# Patient Record
Sex: FEMALE | Race: WHITE | NOT HISPANIC OR LATINO
[De-identification: names, ages, dates, MRNs, and addresses within clinical notes are randomized per-mention and may not be internally consistent; named-entity substitution may affect disease eponyms.]

---

## 2017-03-21 ENCOUNTER — APPOINTMENT (OUTPATIENT)
Dept: GYNECOLOGIC ONCOLOGY | Facility: CLINIC | Age: 47
End: 2017-03-21

## 2017-03-21 VITALS
WEIGHT: 293 LBS | DIASTOLIC BLOOD PRESSURE: 60 MMHG | SYSTOLIC BLOOD PRESSURE: 110 MMHG | HEIGHT: 70 IN | BODY MASS INDEX: 41.95 KG/M2

## 2017-09-26 ENCOUNTER — APPOINTMENT (OUTPATIENT)
Dept: GYNECOLOGIC ONCOLOGY | Facility: CLINIC | Age: 47
End: 2017-09-26

## 2017-11-06 ENCOUNTER — APPOINTMENT (OUTPATIENT)
Dept: GYNECOLOGIC ONCOLOGY | Facility: CLINIC | Age: 47
End: 2017-11-06
Payer: COMMERCIAL

## 2017-11-06 VITALS
BODY MASS INDEX: 41.95 KG/M2 | SYSTOLIC BLOOD PRESSURE: 125 MMHG | HEIGHT: 70 IN | DIASTOLIC BLOOD PRESSURE: 72 MMHG | WEIGHT: 293 LBS

## 2017-11-06 DIAGNOSIS — R19.4 CHANGE IN BOWEL HABIT: ICD-10-CM

## 2017-11-06 DIAGNOSIS — R19.7 DIARRHEA, UNSPECIFIED: ICD-10-CM

## 2017-11-06 PROCEDURE — 99214 OFFICE O/P EST MOD 30 MIN: CPT

## 2017-11-13 LAB — CYTOLOGY CVX/VAG DOC THIN PREP: NORMAL

## 2017-11-18 RX ORDER — SPIRONOLACTONE 25 MG/1
25 TABLET ORAL DAILY
Refills: 0 | Status: ACTIVE | COMMUNITY

## 2017-11-18 RX ORDER — FUROSEMIDE 40 MG/1
40 TABLET ORAL DAILY
Refills: 0 | Status: ACTIVE | COMMUNITY

## 2018-04-23 ENCOUNTER — APPOINTMENT (OUTPATIENT)
Dept: GYNECOLOGIC ONCOLOGY | Facility: CLINIC | Age: 48
End: 2018-04-23
Payer: COMMERCIAL

## 2018-04-23 VITALS
HEIGHT: 70 IN | BODY MASS INDEX: 41.95 KG/M2 | DIASTOLIC BLOOD PRESSURE: 80 MMHG | WEIGHT: 293 LBS | SYSTOLIC BLOOD PRESSURE: 125 MMHG

## 2018-04-23 DIAGNOSIS — R23.2 FLUSHING: ICD-10-CM

## 2018-04-23 DIAGNOSIS — N63.20 UNSPECIFIED LUMP IN THE LEFT BREAST, UNSPECIFIED QUADRANT: ICD-10-CM

## 2018-04-23 PROCEDURE — 99214 OFFICE O/P EST MOD 30 MIN: CPT

## 2018-04-23 RX ORDER — RIVAROXABAN 2.5 MG/1
TABLET, FILM COATED ORAL
Refills: 0 | Status: ACTIVE | COMMUNITY

## 2018-04-27 LAB — CYTOLOGY CVX/VAG DOC THIN PREP: NORMAL

## 2018-10-24 ENCOUNTER — APPOINTMENT (OUTPATIENT)
Dept: GYNECOLOGIC ONCOLOGY | Facility: CLINIC | Age: 48
End: 2018-10-24
Payer: COMMERCIAL

## 2018-10-24 VITALS
HEIGHT: 70 IN | DIASTOLIC BLOOD PRESSURE: 75 MMHG | SYSTOLIC BLOOD PRESSURE: 138 MMHG | WEIGHT: 293 LBS | BODY MASS INDEX: 41.95 KG/M2

## 2018-10-24 PROCEDURE — 99214 OFFICE O/P EST MOD 30 MIN: CPT

## 2019-05-01 ENCOUNTER — APPOINTMENT (OUTPATIENT)
Dept: GYNECOLOGIC ONCOLOGY | Facility: CLINIC | Age: 49
End: 2019-05-01
Payer: COMMERCIAL

## 2019-05-01 VITALS
DIASTOLIC BLOOD PRESSURE: 75 MMHG | HEIGHT: 70 IN | WEIGHT: 293 LBS | BODY MASS INDEX: 41.95 KG/M2 | SYSTOLIC BLOOD PRESSURE: 131 MMHG

## 2019-05-01 PROCEDURE — 99213 OFFICE O/P EST LOW 20 MIN: CPT

## 2019-05-01 RX ORDER — SPIRONOLACTONE 25 MG/1
25 TABLET ORAL DAILY
Refills: 0 | Status: DISCONTINUED | COMMUNITY
End: 2019-05-01

## 2019-05-01 RX ORDER — FUROSEMIDE 40 MG/1
40 TABLET ORAL DAILY
Refills: 0 | Status: DISCONTINUED | COMMUNITY
End: 2019-05-01

## 2019-05-07 LAB
CYTOLOGY CVX/VAG DOC THIN PREP: NORMAL
HPV HIGH+LOW RISK DNA PNL CVX: DETECTED

## 2019-12-02 ENCOUNTER — APPOINTMENT (OUTPATIENT)
Dept: GYNECOLOGIC ONCOLOGY | Facility: CLINIC | Age: 49
End: 2019-12-02

## 2020-02-24 ENCOUNTER — APPOINTMENT (OUTPATIENT)
Dept: GYNECOLOGIC ONCOLOGY | Facility: CLINIC | Age: 50
End: 2020-02-24
Payer: COMMERCIAL

## 2020-02-24 VITALS
SYSTOLIC BLOOD PRESSURE: 104 MMHG | HEART RATE: 78 BPM | WEIGHT: 293 LBS | DIASTOLIC BLOOD PRESSURE: 67 MMHG | HEIGHT: 70 IN | BODY MASS INDEX: 41.95 KG/M2

## 2020-02-24 DIAGNOSIS — R94.8 ABNORMAL RESULTS OF FUNCTION STUDIES OF OTHER ORGANS AND SYSTEMS: ICD-10-CM

## 2020-02-24 PROCEDURE — 99214 OFFICE O/P EST MOD 30 MIN: CPT

## 2020-02-26 LAB
CYTOLOGY CVX/VAG DOC THIN PREP: NORMAL
HPV HIGH+LOW RISK DNA PNL CVX: NOT DETECTED

## 2020-03-01 PROBLEM — R94.8 ABNORMAL POSITRON EMISSION TOMOGRAPHY (PET) SCAN: Status: ACTIVE | Noted: 2020-03-01

## 2020-06-01 ENCOUNTER — APPOINTMENT (OUTPATIENT)
Dept: GYNECOLOGIC ONCOLOGY | Facility: CLINIC | Age: 50
End: 2020-06-01
Payer: COMMERCIAL

## 2020-06-01 DIAGNOSIS — N84.1 POLYP OF CERVIX UTERI: ICD-10-CM

## 2020-06-01 PROCEDURE — 99211 OFF/OP EST MAY X REQ PHY/QHP: CPT | Mod: GT

## 2020-06-01 NOTE — HISTORY OF PRESENT ILLNESS
[FreeTextEntry1] : Referred by: Dr. Norris\par PCP: Dr. BASILIA Solitario\par Cardiologist: Dr. Nichole\par \par Ms. 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 Pertinent history is as follow.\par \par She presented to the ER at [facility] with abdominal pain. Pelvic ultrasound demonstrated a prominent thick walled cystic lesion with debris associated with the right adnexa. On 02/06/15, she underwent exploratory laparotomy, RIGO, RSO, left salpingectomy and partial resection of the left ovary with JARON. Unfortunately pathology demonstrated clear cell carcinoma of the ovary. During recuperation, she suffered several setbacks most importantly an MI requiring cardiac stent and IABP x 2 days in March, an post-operative abdominal abscess requiring reopening of the wound with VAC therapy, and an episode of sepsis requiring a two day stay in ICU.\par \par Since her surgery, she has undergone chemotherapy with Carbol/Taxol x 6 which was completed on 08/27/15.\par \par 06/19/15: CT A/P is significant for interval increase in abdominal  and pelvic lymphadenopathy concerning for interval progression of metastatic disease. \par \par 07/27/15: PET/CT scan demonstrates extensive avid lymphadenopathy in the neck, mediastinum, right hilum, retroperitoneum, bilateral pelvic sidewalls, bilateral inguinal regions and bilateral axillary regions. Focal uptake in the region of valentino hepatis, likely corresponding to a cluster of small nodes is seen, but the exact anatomic correlation is limited. \par \par 08/18/15: FNA of left inguinal lymph nodes was negative for malignant cells. \par \par Of note, additional history includes hypercoagulable disorder (lupus anticoagulant), DVT and PE (2004). She takes arixtra on a daily basis. She is also on antiplatelet therapy with plavix and low dose ASA for the CAD. She will require mitral valve replacement in the future as it was damaged during her MI in March. \par \par Since chemotherapy, she is feeling well except for fatigue which is starting to improve. \par \par Int Hx: (11/6/17) - She RTO feeling well, noting no VB or VD. No pain is reported. She is on Gamma Globulin for persistent Tcp. (4/23/18): Off Gamma Globulin. Tried Retuzin, not continued. No VB or VD. No Pain. No GI or  concerns. Decreased Xarelto dose to 10 from 20. Started pomegranate juice. She notes 1 month ago a ?vein on the left medial aspect of the breast. (prior to the PET she first noted it. No pain there. \par \par Int Hx: (10/24/18) - no VB or VD. No GI or  concerns. [facility] discussed - see below; await reports. She said she was told by PCP she did not need to see a Breast Specialist and she didn't; I reiterated the reason for my advise. She plans ongoing care and CBE by Dr BELEN KELLY. Disc her CT, Pap, and  reports. \par \par Int Hx: (5/1/19) - no VB or VD. No GI or  concerns. [facility] discussed - see below. She confirms ongoing care and CBE by Dr JENNINGS. No LEANNEA report. Disc her PET (4/19) and  (1/19) reports. \par \par Int Hx: (2/24/20) - no VB or VD. No GI or  concerns. [facility]: directed by Dr BELEN PERDOMOA report.  (1/19) =12. US Feb 2020 - ?EC polyp, ? Rov mass v LN. PET Jan 2020 - 2 areas of avidity in pelvic, ? etiology. \par \par Int Hx: (6/1/20)- no VB or VD. No GI or  concerns.  [facility]: directed by Dr. GLOVER  No DEXA report.\par - she reports a prior value of 10 by Dr JENNINGS. She saw Dr JENNINGS in May 2020,  is apparently pending. \par Sono: Feb 2020 - <1cm polyp; ?right and vs LN. PET/CT- 4/22/20- MOHAMUD. \par \par : postop, [id number]=125 (pp), Jul 2015=5, Feb 2016=5.1, 9.9, Sept 2016=4.6, Dec 2016=11, Oct 2017=20, Feb 2018=11, Aug 2018=13, Jan 2019=12, Feb 2020=10 (pp)\par \par HM:\par BHM: Directed by Dr Norris. CBE confirmed by the pt, and she reports being sent for a Mgm. BSEqm disc. \par DEXA: No report. \par Pap: Feb 2020 - Pap neg, HPV Neg\par Colon cancer screening: never

## 2020-06-01 NOTE — DISCUSSION/SUMMARY
[Reviewed Clinical Lab Test(s)] : Results of clinical tests were reviewed. [Reviewed Radiology Report(s)] : Radiology reports were reviewed. [FreeTextEntry1] : I met with the pt for a TH lasting ~7min. \par -We disc her surveillance and asked for the [id number] to be forwarded. I also asked that she inquire about the PET report from Jason Ruiz (complete report) for my review. \par -We discussed her prior sono as well re the ?polyp, and the Pap/HPV testing. We will further eval at next clinical exam. \par -She is due for a DEXA,a nd she said she wishes to defer at this time but will consider when "things settle down."  \par -We discussed her BHM and BSE qm. She says Dr JENNINGS performs the CBE and is sending her for a Mgm. \par -Her instructions were reviewed.\par -All Q/A.\par -She'll RTO for an exam in Aug 2020.

## 2020-06-01 NOTE — REASON FOR VISIT
[FreeTextEntry1] : For oncologic surveillance of ovarian cancer\par \par Pt was id'd by name and birthdate after giving verbal consent for a TH visit.  [Home] : at home, [unfilled] , at the time of the visit. [Medical Office: ([facility])___] : at the medical office located in  [Verbal consent obtained from patient] : the patient, [unfilled]

## 2020-09-21 ENCOUNTER — APPOINTMENT (OUTPATIENT)
Dept: GYNECOLOGIC ONCOLOGY | Facility: CLINIC | Age: 50
End: 2020-09-21
Payer: COMMERCIAL

## 2020-09-21 VITALS
BODY MASS INDEX: 41.95 KG/M2 | WEIGHT: 293 LBS | HEIGHT: 70 IN | SYSTOLIC BLOOD PRESSURE: 123 MMHG | HEART RATE: 68 BPM | DIASTOLIC BLOOD PRESSURE: 86 MMHG

## 2020-09-21 PROCEDURE — 99214 OFFICE O/P EST MOD 30 MIN: CPT

## 2020-09-21 NOTE — REASON FOR VISIT
[Home] : at home, [unfilled] , at the time of the visit. [Medical Office: ([facility])___] : at the medical office located in  [Verbal consent obtained from patient] : the patient, [unfilled] [FreeTextEntry1] : For oncologic surveillance of ovarian cancer\par \par

## 2020-09-21 NOTE — DISCUSSION/SUMMARY
[Reviewed Clinical Lab Test(s)] : Results of clinical tests were reviewed. [Reviewed Radiology Report(s)] : Radiology reports were reviewed. [FreeTextEntry1] : I met with the pt. \par -We disc her surveillance and the prior imaging. I have asked for the [id number] to be forwarded and my card provided with new contact info. Disc imaging schedule thus far, will defer to Dr JENNINGS, unless  or sx warrant. \par -Disc her prior vag sx and current eval; disc role of plain douching. \par -We discussed her Pap and HPV testing.  \par -We still await her DEXA.   \par -We have discussed her BHM and BSE qm. She says \par -Her instructions were reviewed.\par -All Q/A.\par -She'll RTO in 6m.

## 2020-09-21 NOTE — PHYSICAL EXAM
[Abnormal] : Examination of extremities for edema and/or varicosities: Abnormal [Absent] : Uterus: Absent [Normal] : Recto-Vaginal Exam: Normal [de-identified] : 1+ edema [de-identified] : atrophy [de-identified] : T [de-identified] : The remaining adnexa was nonpalpable

## 2020-09-21 NOTE — HISTORY OF PRESENT ILLNESS
[FreeTextEntry1] : Referred by: Dr. Norris\par PCP: Dr. BASILIA Solitario\par Cardiologist: Dr. Nichole\par \par Ms. 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 Pertinent history is as follow.\par \par She presented to the ER at [facility] with abdominal pain. Pelvic ultrasound demonstrated a prominent thick walled cystic lesion with debris associated with the right adnexa. On 02/06/15, she underwent exploratory laparotomy, RIGO, RSO, left salpingectomy and partial resection of the left ovary with JARON. Unfortunately pathology demonstrated clear cell carcinoma of the ovary. During recuperation, she suffered several setbacks most importantly an MI requiring cardiac stent and IABP x 2 days in March, an post-operative abdominal abscess requiring reopening of the wound with VAC therapy, and an episode of sepsis requiring a two day stay in ICU.\par \par Since her surgery, she has undergone chemotherapy with Carbol/Taxol x 6 which was completed on 08/27/15.\par \par 06/19/15: CT A/P is significant for interval increase in abdominal  and pelvic lymphadenopathy concerning for interval progression of metastatic disease. \par \par 07/27/15: PET/CT scan demonstrates extensive avid lymphadenopathy in the neck, mediastinum, right hilum, retroperitoneum, bilateral pelvic sidewalls, bilateral inguinal regions and bilateral axillary regions. Focal uptake in the region of valentino hepatis, likely corresponding to a cluster of small nodes is seen, but the exact anatomic correlation is limited. \par \par 08/18/15: FNA of left inguinal lymph nodes was negative for malignant cells. \par \par Of note, additional history includes hypercoagulable disorder (lupus anticoagulant), DVT and PE (2004). She takes arixtra on a daily basis. She is also on antiplatelet therapy with plavix and low dose ASA for the CAD. She will require mitral valve replacement in the future as it was damaged during her MI in March. \par \par Since chemotherapy, she is feeling well except for fatigue which is starting to improve. \par \par Int Hx: (11/6/17) - She RTO feeling well, noting no VB or VD. No pain is reported. She is on Gamma Globulin for persistent Tcp. (4/23/18): Off Gamma Globulin. Tried Retuzin, not continued. No VB or VD. No Pain. No GI or  concerns. Decreased Xarelto dose to 10 from 20. Started pomegranate juice. She notes 1 month ago a ?vein on the left medial aspect of the breast. (prior to the PET she first noted it. No pain there. \par \par Int Hx: (10/24/18) - no VB or VD. No GI or  concerns. [facility] discussed - see below; await reports. She said she was told by PCP she did not need to see a Breast Specialist and she didn't; I reiterated the reason for my advise. She plans ongoing care and CBE by Dr BELEN KELLY. Disc her CT, Pap, and  reports. \par \par Int Hx: (5/1/19) - no VB or VD. No GI or  concerns. [facility] discussed - see below. She confirms ongoing care and CBE by Dr JENNINGS. No DEXA report. Disc her PET (4/19) and  (1/19) reports. \par \par Int Hx: (2/24/20) - no VB or VD. No GI or  concerns. [facility]: directed by Dr BELEN PERDOMOA report.  (1/19) =12. US Feb 2020 - ?EC polyp, ? Rov mass v LN. PET Jan 2020 - 2 areas of avidity in pelvic, ? etiology. \par \par Int Hx: (6/1/20)- no VB or VD. No GI or  concerns.  [facility]: directed by Dr. GLOVER  No DEXA report.\par - she reports a prior value of 10 by Dr JENNINGS. She saw Dr JENNINGS in May 2020,  is apparently pending. \par Sono: Feb 2020 - <1cm polyp; ?right and vs LN. PET/CT- 4/22/20- MOHAMUD. \par \par \par : postop, [id number]=207 (pp), Jul 2015=5, Feb 2016=5.1, 9.9, Sept 2016=4.6, Dec 2016=11, Oct 2017=20, Feb 2018=11, Aug 2018=13, Jan 2019=12, Feb 2020=10 (pp)\par \par Int Hx: (9/21/20)- no VB or VD. ROS: No GI or  concerns. She reports since last visit she had vaginal itching and used first Vagisil with burning and then Monostat with relief. To see Dr JENNINGS end of this month for eval and . \par PET/CT- 4/22/20- MOHAMUD. \par - she reports a prior value of 10 by Dr JENNINGS. She saw Dr JENNINGS in May 2020. \par Pap/HPV: Feb 2020 - neg\par BHM: directed by Dr. JENNINGS.  \par DEXA: no Report. \par CCS: none rec'd.

## 2020-09-21 NOTE — REVIEW OF SYSTEMS
[Negative] : Musculoskeletal [Abn Vag Bleeding] : no abnormal vaginal bleeding [FreeTextEntry4] : See HPI

## 2021-03-22 ENCOUNTER — APPOINTMENT (OUTPATIENT)
Dept: GYNECOLOGIC ONCOLOGY | Facility: CLINIC | Age: 51
End: 2021-03-22
Payer: COMMERCIAL

## 2021-03-22 VITALS
DIASTOLIC BLOOD PRESSURE: 81 MMHG | HEIGHT: 70 IN | BODY MASS INDEX: 36.22 KG/M2 | SYSTOLIC BLOOD PRESSURE: 143 MMHG | HEART RATE: 96 BPM | WEIGHT: 253 LBS

## 2021-03-22 PROCEDURE — 99072 ADDL SUPL MATRL&STAF TM PHE: CPT

## 2021-03-22 PROCEDURE — 99213 OFFICE O/P EST LOW 20 MIN: CPT

## 2021-09-16 ENCOUNTER — NON-APPOINTMENT (OUTPATIENT)
Age: 51
End: 2021-09-16

## 2021-09-27 ENCOUNTER — APPOINTMENT (OUTPATIENT)
Dept: GYNECOLOGIC ONCOLOGY | Facility: CLINIC | Age: 51
End: 2021-09-27
Payer: COMMERCIAL

## 2021-09-27 ENCOUNTER — TRANSCRIPTION ENCOUNTER (OUTPATIENT)
Age: 51
End: 2021-09-27

## 2021-09-27 VITALS
HEIGHT: 70 IN | BODY MASS INDEX: 41.95 KG/M2 | DIASTOLIC BLOOD PRESSURE: 71 MMHG | WEIGHT: 293 LBS | HEART RATE: 76 BPM | SYSTOLIC BLOOD PRESSURE: 116 MMHG

## 2021-09-27 PROCEDURE — 99213 OFFICE O/P EST LOW 20 MIN: CPT

## 2021-12-01 ENCOUNTER — NON-APPOINTMENT (OUTPATIENT)
Age: 51
End: 2021-12-01

## 2021-12-03 ENCOUNTER — APPOINTMENT (OUTPATIENT)
Dept: GYNECOLOGIC ONCOLOGY | Facility: CLINIC | Age: 51
End: 2021-12-03
Payer: COMMERCIAL

## 2021-12-03 VITALS
BODY MASS INDEX: 41.95 KG/M2 | HEIGHT: 70 IN | HEART RATE: 77 BPM | DIASTOLIC BLOOD PRESSURE: 74 MMHG | SYSTOLIC BLOOD PRESSURE: 113 MMHG | WEIGHT: 293 LBS

## 2021-12-03 DIAGNOSIS — R93.89 ABNORMAL FINDINGS ON DIAGNOSTIC IMAGING OF OTHER SPECIFIED BODY STRUCTURES: ICD-10-CM

## 2021-12-03 PROCEDURE — 99214 OFFICE O/P EST MOD 30 MIN: CPT

## 2022-03-27 ENCOUNTER — NON-APPOINTMENT (OUTPATIENT)
Age: 52
End: 2022-03-27

## 2022-03-28 ENCOUNTER — APPOINTMENT (OUTPATIENT)
Dept: GYNECOLOGIC ONCOLOGY | Facility: CLINIC | Age: 52
End: 2022-03-28

## 2022-04-25 ENCOUNTER — APPOINTMENT (OUTPATIENT)
Dept: GYNECOLOGIC ONCOLOGY | Facility: CLINIC | Age: 52
End: 2022-04-25
Payer: COMMERCIAL

## 2022-04-25 VITALS — HEIGHT: 70 IN | HEART RATE: 75 BPM | SYSTOLIC BLOOD PRESSURE: 103 MMHG | DIASTOLIC BLOOD PRESSURE: 66 MMHG

## 2022-04-25 PROCEDURE — 99215 OFFICE O/P EST HI 40 MIN: CPT

## 2022-04-27 ENCOUNTER — NON-APPOINTMENT (OUTPATIENT)
Age: 52
End: 2022-04-27

## 2022-05-04 ENCOUNTER — NON-APPOINTMENT (OUTPATIENT)
Age: 52
End: 2022-05-04

## 2022-07-25 ENCOUNTER — APPOINTMENT (OUTPATIENT)
Dept: GYNECOLOGIC ONCOLOGY | Facility: CLINIC | Age: 52
End: 2022-07-25

## 2022-07-25 VITALS
HEART RATE: 73 BPM | SYSTOLIC BLOOD PRESSURE: 117 MMHG | DIASTOLIC BLOOD PRESSURE: 72 MMHG | WEIGHT: 293 LBS | HEIGHT: 70 IN | BODY MASS INDEX: 41.95 KG/M2

## 2022-07-25 DIAGNOSIS — R19.00 INTRA-ABDOMINAL AND PELVIC SWELLING, MASS AND LUMP, UNSPECIFIED SITE: ICD-10-CM

## 2022-07-25 PROCEDURE — 99214 OFFICE O/P EST MOD 30 MIN: CPT

## 2022-10-31 ENCOUNTER — APPOINTMENT (OUTPATIENT)
Dept: GYNECOLOGIC ONCOLOGY | Facility: CLINIC | Age: 52
End: 2022-10-31

## 2022-11-21 ENCOUNTER — APPOINTMENT (OUTPATIENT)
Dept: GYNECOLOGIC ONCOLOGY | Facility: CLINIC | Age: 52
End: 2022-11-21

## 2022-11-21 VITALS — BODY MASS INDEX: 41.95 KG/M2 | WEIGHT: 293 LBS | HEIGHT: 70 IN

## 2022-11-21 PROCEDURE — 99214 OFFICE O/P EST MOD 30 MIN: CPT

## 2023-02-06 PROBLEM — C56.9 RECURRENT CARCINOMA OF OVARY: Status: ACTIVE | Noted: 2022-04-25

## 2023-02-06 PROBLEM — R59.1 LYMPHADENOPATHY: Status: ACTIVE | Noted: 2022-11-21

## 2023-02-06 PROBLEM — R22.2 ABDOMINAL WALL MASS: Status: ACTIVE | Noted: 2021-12-03

## 2023-02-13 ENCOUNTER — APPOINTMENT (OUTPATIENT)
Dept: GYNECOLOGIC ONCOLOGY | Facility: CLINIC | Age: 53
End: 2023-02-13
Payer: COMMERCIAL

## 2023-02-13 VITALS
SYSTOLIC BLOOD PRESSURE: 96 MMHG | WEIGHT: 293 LBS | DIASTOLIC BLOOD PRESSURE: 65 MMHG | HEIGHT: 70 IN | BODY MASS INDEX: 41.95 KG/M2 | HEART RATE: 69 BPM

## 2023-02-13 DIAGNOSIS — C56.9 MALIGNANT NEOPLASM OF UNSPECIFIED OVARY: ICD-10-CM

## 2023-02-13 DIAGNOSIS — R22.2 LOCALIZED SWELLING, MASS AND LUMP, TRUNK: ICD-10-CM

## 2023-02-13 DIAGNOSIS — R59.1 GENERALIZED ENLARGED LYMPH NODES: ICD-10-CM

## 2023-02-13 PROCEDURE — 99213 OFFICE O/P EST LOW 20 MIN: CPT

## 2023-05-15 ENCOUNTER — APPOINTMENT (OUTPATIENT)
Dept: GYNECOLOGIC ONCOLOGY | Facility: CLINIC | Age: 53
End: 2023-05-15

## 2023-05-15 ENCOUNTER — NON-APPOINTMENT (OUTPATIENT)
Age: 53
End: 2023-05-15

## 2023-09-01 ENCOUNTER — APPOINTMENT (OUTPATIENT)
Dept: GYNECOLOGIC ONCOLOGY | Facility: CLINIC | Age: 53
End: 2023-09-01

## 2023-09-01 DIAGNOSIS — C56.9 MALIGNANT NEOPLASM OF UNSPECIFIED OVARY: ICD-10-CM
